# Patient Record
Sex: FEMALE | Race: WHITE | NOT HISPANIC OR LATINO
[De-identification: names, ages, dates, MRNs, and addresses within clinical notes are randomized per-mention and may not be internally consistent; named-entity substitution may affect disease eponyms.]

---
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
## 2023-05-01 NOTE — H&P PEDIATRIC - PROBLEM SELECTOR PLAN 1
PHQ 9 and ELEAZAR sent via [employer]. Megan Cristobal CMA      
Refill approved per [facility] refill protocol.    Rhonda MARQUEZ RN   [facility] Triage       
-NPO  -mIVF  -Fredrick Trujillo prn  -Contact precautions for diarrhea   -Strict I/Os

## 2023-08-24 NOTE — ED PEDIATRIC NURSE NOTE - NSFALLRSKASSESSTYPE_ED_ALL_ED
Initial (On Arrival) Counseling Text: I reviewed the side effect in detail. Patient should get monthly blood tests, not donate blood, not drive at night if vision affected, and not share medication.

## 2024-01-10 NOTE — ED PROVIDER NOTE - LATERALITY
bilateral No. RACHEL screening performed.  STOP BANG Legend: 0-2 = LOW Risk; 3-4 = INTERMEDIATE Risk; 5-8 = HIGH Risk

## 2024-01-11 ENCOUNTER — TRANSCRIPTION ENCOUNTER (OUTPATIENT)
Age: 19
End: 2024-01-11

## 2024-12-30 NOTE — ED PEDIATRIC NURSE NOTE - GASTROINTESTINAL WDL
Patients son called in requesting an order for nebulizer solution be sent to patients pharmacy. Patient currently has RSV and could not come in to her appt today due to being sick. Please advise. Son states patient already has machine.   Abdomen soft, nontender, nondistended, bowel sounds present in all 4 quadrants.

## 2025-01-27 NOTE — ED PEDIATRIC NURSE NOTE - OBJECTIVE STATEMENT
Action Requested: Summary for Provider     []  Critical Lab, Recommendations Needed  [x] Need Additional Advice  []   FYI    []   Need Orders  [] Need Medications Sent to Pharmacy  []  Other     SUMMARY: appointment given Tuesday to further evaluate.    Reason for call: Pelvic Pain/bladder spasms  Onset: 2-3 days  Patient complains of pelvic pain \"by ovaries\"  right side more pain; and lower back pain.   Bladder feels spasming; hx of dropped bladder. \"Cystocele\"    No pain with urination. No blood in urine.       Reason for Disposition   Patient wants to be seen    Protocols used: Pelvic Pain - Female-A-[place]     14 year old female p/w vomiting for 6 hours, unable to tolerate PO, 10/10 generalized abdominal pain. PMH vomiting, followed by GI, mother states she was going to start a new medication monday. Vaccinations up to date. No recent travel. Emesis is clear with brown/grey particles.